# Patient Record
(demographics unavailable — no encounter records)

---

## 2024-12-16 NOTE — SOCIAL HISTORY
[Parent(s)] : parent(s) [___ Sisters] : [unfilled] sisters [None] : none [Smokers in Household] : there are smokers in the home [Grade:  _____] : Grade: [unfilled] [de-identified] : Father

## 2024-12-16 NOTE — REVIEW OF SYSTEMS
[NI] : Allergic [Nl] : Endocrine [Daytime Hyperactivity] : daytime hyperactivity [Nocturia] : nocturia [Muscle Weakness] : muscle weakness [Developmental Delay] : developmental delay [Rash] : rash [Eczema] : eczema [Hyperactive] : hyperactive behavior [Frequent URIs] : frequent upper respiratory infections [Snoring] : no snoring [Apnea] : no apnea [Restlessness] : no restlessness [Daytime Sleepiness] : no daytime sleepiness [Voice Changes] : no voice changes [Frequent Croup] : no frequent croup [Chronic Hoarseness] : no chronic hoarseness [Rhinorrhea] : no rhinorrhea [Nasal Congestion] : no nasal congestion [Sinus Problems] : no sinus problems [Postnasl Drip] : no postnasal drip [Epistaxis] : no epistaxis [Recurrent Ear Infections] : no recurrent ear infections [Recurrent Sinus Infections] : no recurrent sinus infections [Recurrent Throat Infections] : no recurrent throat infections [Tachypnea] : not tachypneic [Wheezing] : no wheezing [Cough] : cough [Shortness of Breath] : no shortness of breath [Pneumonia] : no pneumonia [Hemoptysis] : no hemoptysis [Sputum] : no sputum [Chronically Infected with ___] : no chronic infections [Spitting Up] : not spitting up [Problems Swallowing] : no problems swallowing [Diarrhea] : no diarrhea [Constipation] : no constipation [Foul Smelling Stool] : no foul smelling stool [Oily Stool] : no oily stool [Vomiting] : no vomiting [Food Intolerance] : food tolerant [Abdomen Distention] : abdomen not distended [Rectal Prolapse] : no rectal prolapse [Urgency] : no feelings of urinary urgency [Dysuria] : no dysuria [Seizure] : no seizures [Brain Hemorrhage] : no brain hemorrhage [Syncope] : no fainting [Head Injury] : no head injury [Birth Marks] : no birth marks [Skin Infections] : no skin infections [Sleep Disturbances] : ~T no sleep disturbances [Depression] : no depression [Anxiety] : no anxiety [de-identified] : Autism spectrum disorder

## 2024-12-16 NOTE — ASSESSMENT
[FreeTextEntry1] : Impression: Intermittent asthma, attention deficit hyperactivity disorder, autism spectrum disorder, developmental delay, atopic dermatitis , vitamin D deficiency.   Intermittent asthma: Suggested administering Symbicort on an as-needed basis 80/4.5 mcg a puff, 2 puffs twice daily as needed with viral respiratory infections.  Albuterol is to be administered prior to activity and every 4 hours as needed.  Medication administration form was filled out for the child to receive albuterol prior to activity at school.  Extensive asthma education was provided via asthma educator.  Asthma action plan was provided in writing to increase medications with viral respiratory infections. Rule out obstructive sleep apnea hypopnea syndrome: Sleep appears improved. Sleep onset insomnia: She is sleeping 8 to 9 hours.  She is no longer waking up during the night.  Sleep hygiene: This appears improved.  Nonallergic rhinitis: Respiratory allergy panel by ImmunoCAP technique negative.  Claritin is to be administered as needed.  Vitamin D deficiency: If insurance does not cover vitamin D3 encouraged mother to purchase this, vitamin D3, 2000 international units daily. Atopic dermatitis: Suggested avoiding tub baths.  Suggested using soap free products to cleanse and using Vaseline liberally with a topical steroid sparingly.  Over 50% of time was spent in the in counseling.   I asked mother  to bring her back for a follow-up visit in 4 month's time.

## 2024-12-16 NOTE — HISTORY OF PRESENT ILLNESS
[FreeTextEntry1] : This 9-year-old was seen for a follow-up visit. I had last seen her February 2023.  She was brought in by father.  Mother provided details over the telephone.  She has 1-2 colds a year with coughing for 2 weeks or so.  She had a sick visit May 2024.  She was not on any routine medications.  According to mother she is hyper with montelukast.  She coughs with vigorous activity but was not receiving albuterol prior to activity so the school was limiting her activity.  She does not cough at night and had not been nasally congested.  She had a psychiatry as well as neuropsychological evaluation.  She has been diagnosed to have attention deficit hyperactivity disorder, autism spectrum disorder and developmental delay.  Sleep: She does not snore at night.  She receives occupational therapy speech therapy and [facility].   Atopic dermatitis was in good control.  Mother uses a lotion. She does not drink milk.  She vomits all forms of milk. She was not receiving vitamin D3 supplements.  The family has gone to court and the school had been obliged to administer albuterol at school.  She goes to a private school.  She does not cough at night.  She had 2 emergency room visits.  When she was positive for the respiratory syncytial virus and the second time she had influenza A.  She was seen in the emergency room both times.  She tolerates activity well if she receives albuterol prior to activity.  The family went to court and now they have an [facility] therapist.  Her 25 hydroxy vitamin D level is 16 NG per mL.  Vitamin D3 was not covered by insurance.    Respiratory allergy panel by the ImmunoCAP technique was negative.  IgE 8.  CBC with normal eosinophils.  She was seen in the emergency room for fever and cough.  She coughs with activity.;she has a history of keeping stuffy nose..  She has a history of coughing at night 2-3 times a week.  She had a sick visit when she was diagnosed to have sinusitis.  Sleep: I had ordered an overnight polysomnogram.  This has not been performed.  She is no longer snoring at night.    Melatonin was not helpful and so was discontinued.  She sleeps between 9 and 920 and awakens at 6 AM.    She has atopic dermatitis and had 3 sick visits for atopic dermatitis.  She has been diagnosed to have rosacea.    She has an IEP and receives occupational therapy, and speech therapy.      She has a history of not chewing well so that the food tends to get stuck between her teeth and then she would spit out the food.  This appears to be improving. PAST MEDICAL HISTORY:   She has a longstanding history of coughing, wheezing and shortness of breath.  She initially became symptomatic between 1 and 2 years of age. Hospitalizations: She was hospitalized at a year of age with shortness of breath, coughing and dehydration.  According to mother, she had perhaps 20 emergency room visits during this period of time.  After 2 years of age she would have 6-7 emergency room visits a year for coughing and shortness of breath.  She was on controller medications for a diagnosis of asthma, for a while which helped control symptoms.  She was seen in the emergency room with a toothache. She was seen in the emergency room January 2022 and was treated with steroids for respiratory symptoms.  She had a second emergency room visit 2022 for respiratory symptoms.  She was seen in the emergency room November and December 2022 for asthma exacerbations. Surgery: She has never been operated on.   She has had an orthopedic evaluation for scoliosis.  X-ray showed 10 degrees scoliosis.  Follow-up x-ray of her spine and legs is planned.   She does not drink milk.  She has soft bowel movements.  With activity she coughs and is short of breath.  If she cries she has a history of coughing and vomiting.   She has been diagnosed to have autism spectrum disorder at 18 months of age.  She has been diagnosed to have attention deficit hyperactivity disorder.  She is to follow-up with developmental pediatrics.  She is nonverbal.  It is difficult to give her medications.

## 2024-12-16 NOTE — PHYSICAL EXAM
[Well Nourished] : well nourished [Well Developed] : well developed [No Allergic Shiners] : no allergic shiners [No Drainage] : no drainage [No Conjunctivitis] : no conjunctivitis [No Nasal Drainage] : no nasal drainage [No Polyps] : no polyps [No Sinus Tenderness] : no sinus tenderness [No Oral Pallor] : no oral pallor [No Exudates] : no exudates [No Postnasal Drip] : no postnasal drip [Tonsil Size ___] : tonsil size [unfilled] [No Tonsillar Enlargement] : no tonsillar enlargement [No Stridor] : no stridor [Absence Of Retractions] : absence of retractions [Symmetric] : symmetric [Good Expansion] : good expansion [No Acc Muscle Use] : no accessory muscle use [Good aeration to bases] : good aeration to bases [Equal Breath Sounds] : equal breath sounds bilaterally [No Crackles] : no crackles [No Rhonchi] : no rhonchi [No Wheezing] : no wheezing [Normal Sinus Rhythm] : normal sinus rhythm [No Heart Murmur] : no heart murmur [Soft, Non-Tender] : soft, non-tender [No Hepatosplenomegaly] : no hepatosplenomegaly [Non Distended] : was not ~L distended [Abdomen Mass (___ Cm)] : no abdominal mass palpated [Abdomen Hernia] : no hernia was discovered [Full ROM] : full range of motion [No Clubbing] : no clubbing [Capillary Refill < 2 secs] : capillary refill less than two seconds [No Cyanosis] : no cyanosis [No Petechiae] : no petechiae [No Contractures] : no contractures [Abnormal Walk] : normal gait [No Abnormal Focal Findings] : no abnormal focal findings [No Birth Marks] : no birth marks [No Rashes] : no rashes [No Skin Ulcers] : no skin ulcers [FreeTextEntry1] : Nonverbal,   Moderate late developed and nourished [FreeTextEntry3] : Cerumen on the right [de-identified] : Mild scoliosis [de-identified] : Nonverbal [de-identified] : Skin clear

## 2024-12-16 NOTE — CONSULT LETTER
[Dear  ___] : Dear  [unfilled], [Consult Letter:] : I had the pleasure of evaluating your patient, [unfilled]. [Please see my note below.] : Please see my note below. [Consult Closing:] : Thank you very much for allowing me to participate in the care of this patient.  If you have any questions, please do not hesitate to contact me. [Sincerely,] : Sincerely, [FreeTextEntry3] : Yoly Hatch MD\par  Pediatric Pulmonology and Sleep Medicine\par  Director [facility]\par  , Pediatric Sleep Disorders,\par   of Pediatrics, [facility],\par  [address] [place] [address]\[place] [address]\par  (P)[phone]\par  (P) [phone]\par  (F) [phone] \par  \par